# Patient Record
(demographics unavailable — no encounter records)

---

## 2025-02-21 NOTE — REVIEW OF SYSTEMS
[Chest Discomfort] : chest discomfort [Palpitations] : palpitations [Under Stress] : under stress [Negative] : Heme/Lymph

## 2025-02-21 NOTE — ASSESSMENT
[FreeTextEntry1] : 31 yo female with recurrent palpitations/chest discomfort over the past several years.  ECG from 1/31/25 was reviewed today and demonstrated sinus rhythm. CXR report and labs from 1/31/25 were reviewed today and found to be unremarkable.  Zio XT monitor was placed in the office today to monitor for arrhythmias over the next 2 weeks. Will order echocardiogram to assess ventricular function and structural heart disease. Patient to return for labs (TSH and free T4).  Pending review of test results, will determine if further cardiac work-up or intervention is clinically indicated. Stress management and reduction in caffeine consumption were advised.

## 2025-02-21 NOTE — HISTORY OF PRESENT ILLNESS
[FreeTextEntry1] : 31 yo female who presents today for cardiac evaluation for recurrent palpitations/chest discomfort. She reports having intermittent random episodes of palpitations followed by chest discomfort over the past several years. She reports chronic work-related stress and drinks 16-24 oz of coffee daily. Patient denies dyspnea, syncope, edema, melena, hematochezia, or hematemesis. She was recently evaluated at [facility] on 1/31/25 where ECG, CXR, and labs (CBC, CMP) were unremarkable.

## 2025-02-21 NOTE — HISTORY OF PRESENT ILLNESS
[FreeTextEntry1] : 33 yo female who presents today for cardiac evaluation for recurrent palpitations/chest discomfort. She reports having intermittent random episodes of palpitations followed by chest discomfort over the past several years. She reports chronic work-related stress and drinks 16-24 oz of coffee daily. Patient denies dyspnea, syncope, edema, melena, hematochezia, or hematemesis. She was recently evaluated at [facility] on 1/31/25 where ECG, CXR, and labs (CBC, CMP) were unremarkable.

## 2025-02-21 NOTE — ASSESSMENT
[FreeTextEntry1] : 33 yo female with recurrent palpitations/chest discomfort over the past several years.  ECG from 1/31/25 was reviewed today and demonstrated sinus rhythm. CXR report and labs from 1/31/25 were reviewed today and found to be unremarkable.  Zio XT monitor was placed in the office today to monitor for arrhythmias over the next 2 weeks. Will order echocardiogram to assess ventricular function and structural heart disease. Patient to return for labs (TSH and free T4).  Pending review of test results, will determine if further cardiac work-up or intervention is clinically indicated. Stress management and reduction in caffeine consumption were advised.

## 2025-03-14 NOTE — CARDIOLOGY SUMMARY
[de-identified] : 1/31/25 ECG: Sinus rhythm. rate 64 bpm [de-identified] : 3/3/25 Echo: Normal LV size and systolic/diastolic function, LVEF 63%. Normal RV size and systolic function. Mildly dilated LA. Trace MR. Mild to mod TR.  [de-identified] : Zio XT monitor (from 2/20/25 to 2/23/25): Sinus rhythm with average HR 74 bpm (range  bpm). Rare PACs and atrial couplets. Rare PVCs and ventricular couplets. One run of supraventricular tachycardia lasting 4 beats.

## 2025-03-14 NOTE — HISTORY OF PRESENT ILLNESS
[FreeTextEntry1] : 33 yo female who was initially seen on 2/20/25 for cardiac evaluation of recurrent palpitations/chest discomfort. She presents today to review her test results. Patient continues to report intermittent palpitations when stressed, but denies dyspnea, syncope, edema, melena, hematochezia, or hematemesis.

## 2025-03-14 NOTE — ASSESSMENT
[FreeTextEntry1] : 33 yo female with recurrent palpitations over the past several years particularly when stressed. Echo on 3/3/25 demonstrated normal LV size and systolic/diastolic function, LVEF 63%, normal RV size and systolic function, mildly dilated LA, trace MR, and mild to mod TR.  Zio XT monitor (from 2/20/25 to 2/23/25) reported sinus rhythm with average HR 74 bpm (range  bpm), rare PACs/atrial couplets, rare PVCs/ventricular couplets, and one run of supraventricular tachycardia lasting 4 beats.  Given infrequent ectopy and brief non-sustained run of SVT will continue to monitor at this time. Given resting HR in 50-60s, patient is unlikely to tolerate beta-blocker therapy. Stress management and reduction in caffeine consumption were advised.

## 2025-03-14 NOTE — PHYSICAL EXAM
[No Acute Distress] : no acute distress [No Respiratory Distress] : no respiratory distress  [Moves all extremities] : moves all extremities [No Focal Deficits] : no focal deficits [Normal Speech] : normal speech [Alert and Oriented] : alert and oriented [Normal memory] : normal memory [Normal Rate] : normal [Rhythm Regular] : regular

## 2025-04-18 NOTE — HISTORY OF PRESENT ILLNESS
[FreeTextEntry1] : Anemia, fatigue and dizziness. [de-identified] : 32-year-old female new patient here to establish care with new PCP and for work employment evaluation. Reports history of recurrent palpitations, anemia, toenail onychomycosis.  Following with cardiology Dr. Britt for palpitations with completion of transthoracic echo and ZIO Holter monitor. She has brief nonsustained SVT and infrequent ectopy with baseline bradycardia. Advise stress management and caffeine avoidance.  Review of labs done 1/31/2025 at [facility] as follows: CBC with WBC 7.34, H/H10.1/31.2, MCV 78.8, platelets 359. 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

## 2025-04-18 NOTE — ASSESSMENT
[Vaccines Reviewed] : Immunizations reviewed today. Please see immunization details in the vaccine log within the immunization flowsheet.  [FreeTextEntry1] : Anemia labs likely iron deficiency due to menorrhagia. Declines OCPs or hormonal contraception Advise iron supplements, increase fiber to reduce adverse effect of constipation Follow up 2 months for repeat labs  Palpitations possibly exacerbated by anemia. TSH drawn to rule out underlying thyroid disorder.   Employment forms completed and returned to patient

## 2025-04-18 NOTE — PHYSICAL EXAM
[PERRL] : pupils equal round and reactive to light [EOMI] : extraocular movements intact [Alert and Oriented x3] : oriented to person, place, and time [Normal] : affect was normal and insight and judgment were intact [de-identified] : pale conjunctiva

## 2025-04-18 NOTE — HEALTH RISK ASSESSMENT
[Very Good] : ~his/her~  mood as very good [Yes] : Yes [2 - 4 times a month (2 pts)] : 2-4 times a month (2 points) [1 or 2 (0 pts)] : 1 or 2 (0 points) [Never (0 pts)] : Never (0 points) [One fall no injury in past year] : Patient reported one fall in the past year without injury [0] : 2) Feeling down, depressed, or hopeless: Not at all (0) [Patient reported mammogram was normal] : Patient reported mammogram was normal [Patient reported PAP Smear was normal] : Patient reported PAP Smear was normal [Former] : Former [0-4] : 0-4 [< 15 Years] : < 15 Years [PHQ-2 Negative - No further assessment needed] : PHQ-2 Negative - No further assessment needed [HIV Test offered] : HIV Test offered [Hepatitis C test offered] : Hepatitis C test offered [With Family] : lives with family [Employed] : employed [Sexually Active] : sexually active [Audit-CScore] : 2 [de-identified] : gym 4-5 times a week. [[id number]] : 0 [Change in mental status noted] : No change in mental status noted [MammogramDate] : 2010 [PapSmearDate] : 2023 [de-identified] : parents [FreeTextEntry2] :  [de-identified] : 2017